# Patient Record
Sex: MALE | Race: WHITE | NOT HISPANIC OR LATINO | Employment: PART TIME | URBAN - METROPOLITAN AREA
[De-identification: names, ages, dates, MRNs, and addresses within clinical notes are randomized per-mention and may not be internally consistent; named-entity substitution may affect disease eponyms.]

---

## 2022-11-02 ENCOUNTER — HOSPITAL ENCOUNTER (OUTPATIENT)
Dept: RADIOLOGY | Facility: HOSPITAL | Age: 67
Discharge: HOME | End: 2022-11-02
Attending: FAMILY MEDICINE

## 2022-11-02 DIAGNOSIS — Z03.89 CORONARY ARTERY DISEASE (CAD) EXCLUDED: ICD-10-CM

## 2023-07-06 ENCOUNTER — OFFICE VISIT (OUTPATIENT)
Dept: URGENT CARE | Age: 68
End: 2023-07-06

## 2023-07-06 VITALS
HEART RATE: 92 BPM | TEMPERATURE: 97.7 F | RESPIRATION RATE: 20 BRPM | DIASTOLIC BLOOD PRESSURE: 80 MMHG | SYSTOLIC BLOOD PRESSURE: 140 MMHG | OXYGEN SATURATION: 97 % | WEIGHT: 188 LBS

## 2023-07-06 DIAGNOSIS — M25.562 ACUTE PAIN OF LEFT KNEE: Primary | ICD-10-CM

## 2023-07-06 NOTE — PROGRESS NOTES
[facility]        NAME: Rudy Garcia is a 77 y.o. male  : 1955    MRN: [id number]  DATE: 2023  TIME: 3:33 PM    Assessment and Plan   Acute pain of left knee [M25.562]  1. Acute pain of left knee              Patient Instructions     Continue taking aspirin daily  Consider physical therapy if pain persist   follow up with PCP in 3-5 days. Proceed to  ER if symptoms worsen. Chief Complaint     Chief Complaint   Patient presents with   • Leg Pain   • Knee Pain     Left leg pain and knee for 2 weeks. History of Present Illness       HPI   Presents to clinic with complaint of pain on the left knee for 2 weeks. Started while he was on a bus trip to [place]. The total duration of the trip was 3 days. States half of the time they were in the past. Denies previous problems of the knee. Pain is worse with weightbearing. Better with rest and aspirin. No radiation of the pain. Denies swelling of the knee. No injuries    Review of Systems   Review of Systems   Musculoskeletal: Positive for arthralgias (left knee). Negative for back pain and joint swelling. Skin: Negative for color change, rash and wound. Neurological: Negative for weakness and numbness. Current Medications     No current outpatient medications on file. Current Allergies     Allergies as of 2023   • (No Known Allergies)            The following portions of the patient's history were reviewed and updated as appropriate: allergies, current medications, past family history, past medical history, past social history, past surgical history and problem list.     History reviewed. No pertinent past medical history. History reviewed. No pertinent surgical history. No family history on file. Medications have been verified.         Objective   /80 (BP Location: Left arm) Comment (BP Location): manual  Pulse 92   Temp 97.7 °F (36.5 °C) (Temporal)   Resp 20   Wt 85.3 kg (188 lb)   SpO2 97% No LMP for male patient. Physical Exam     Physical Exam  Musculoskeletal:         General: Tenderness (with palpation of the posterior aspect of the L knee. No pain with palpation of the lateral, medial and anterior aspects of the knee) present. No swelling or deformity. Skin:     Findings: No bruising. Neurological:      Gait: Gait abnormal (slight limp, favoring the left knee).

## 2023-12-15 ENCOUNTER — TELEPHONE (OUTPATIENT)
Age: 68
End: 2023-12-15

## 2023-12-15 NOTE — TELEPHONE ENCOUNTER
Patient called and scheduled an new patient appointment and reviewed chart to schedule appropriate visit type.

## 2024-01-10 RX ORDER — SILDENAFIL 100 MG/1
TABLET, FILM COATED ORAL
COMMUNITY
End: 2024-01-11

## 2024-01-10 RX ORDER — LISINOPRIL AND HYDROCHLOROTHIAZIDE 20; 12.5 MG/1; MG/1
2 TABLET ORAL DAILY
COMMUNITY

## 2024-01-10 RX ORDER — SILDENAFIL CITRATE 20 MG/1
TABLET ORAL
COMMUNITY
End: 2024-01-11

## 2024-01-11 ENCOUNTER — OFFICE VISIT (OUTPATIENT)
Dept: FAMILY MEDICINE CLINIC | Facility: CLINIC | Age: 69
End: 2024-01-11
Payer: MEDICARE

## 2024-01-11 VITALS
OXYGEN SATURATION: 99 % | BODY MASS INDEX: 30.16 KG/M2 | HEIGHT: 68 IN | WEIGHT: 199 LBS | DIASTOLIC BLOOD PRESSURE: 70 MMHG | HEART RATE: 89 BPM | SYSTOLIC BLOOD PRESSURE: 132 MMHG

## 2024-01-11 DIAGNOSIS — Z12.5 SCREENING FOR MALIGNANT NEOPLASM OF PROSTATE: ICD-10-CM

## 2024-01-11 DIAGNOSIS — I25.10 CORONARY ARTERY DISEASE INVOLVING NATIVE CORONARY ARTERY OF NATIVE HEART WITHOUT ANGINA PECTORIS: Primary | ICD-10-CM

## 2024-01-11 DIAGNOSIS — I10 BENIGN ESSENTIAL HYPERTENSION: ICD-10-CM

## 2024-01-11 DIAGNOSIS — E78.2 MIXED HYPERLIPIDEMIA: ICD-10-CM

## 2024-01-11 DIAGNOSIS — N52.03 COMBINED ARTERIAL INSUFFICIENCY AND CORPORO-VENOUS OCCLUSIVE ERECTILE DYSFUNCTION: ICD-10-CM

## 2024-01-11 DIAGNOSIS — M67.911 DISORDER OF ROTATOR CUFF OF BOTH SHOULDERS: ICD-10-CM

## 2024-01-11 DIAGNOSIS — M67.912 DISORDER OF ROTATOR CUFF OF BOTH SHOULDERS: ICD-10-CM

## 2024-01-11 DIAGNOSIS — R73.01 ELEVATED FASTING BLOOD SUGAR: ICD-10-CM

## 2024-01-11 DIAGNOSIS — Z13.0 SCREENING FOR DEFICIENCY ANEMIA: ICD-10-CM

## 2024-01-11 PROBLEM — M67.919 DISORDER OF ROTATOR CUFF: Status: ACTIVE | Noted: 2024-01-11

## 2024-01-11 PROBLEM — M25.519 SHOULDER JOINT PAIN: Status: ACTIVE | Noted: 2024-01-11

## 2024-01-11 PROBLEM — M77.10 LATERAL EPICONDYLITIS: Status: ACTIVE | Noted: 2024-01-11

## 2024-01-11 PROBLEM — E78.00 HIGH CHOLESTEROL: Status: RESOLVED | Noted: 2024-01-11 | Resolved: 2024-01-11

## 2024-01-11 PROBLEM — E78.00 HIGH CHOLESTEROL: Status: ACTIVE | Noted: 2024-01-11

## 2024-01-11 PROBLEM — M25.529 PAIN IN ELBOW: Status: RESOLVED | Noted: 2024-01-11 | Resolved: 2024-01-11

## 2024-01-11 PROBLEM — M77.10 LATERAL EPICONDYLITIS: Status: RESOLVED | Noted: 2024-01-11 | Resolved: 2024-01-11

## 2024-01-11 PROBLEM — M25.529 PAIN IN ELBOW: Status: ACTIVE | Noted: 2024-01-11

## 2024-01-11 PROBLEM — M54.2 NECK PAIN: Status: ACTIVE | Noted: 2024-01-11

## 2024-01-11 PROBLEM — M54.2 NECK PAIN: Status: RESOLVED | Noted: 2024-01-11 | Resolved: 2024-01-11

## 2024-01-11 PROCEDURE — 99204 OFFICE O/P NEW MOD 45 MIN: CPT | Performed by: FAMILY MEDICINE

## 2024-01-11 RX ORDER — ATORVASTATIN CALCIUM 20 MG/1
20 TABLET, FILM COATED ORAL DAILY
COMMUNITY
Start: 2023-11-07

## 2024-01-11 RX ORDER — SILDENAFIL CITRATE 20 MG/1
20 TABLET ORAL AS NEEDED
Start: 2024-01-11

## 2024-01-11 NOTE — PROGRESS NOTES
New Patient Outpatient Note    HPI:     Colt Purvis, 68 y.o. male  presents today as a new patient and to establish care.  The patient has a significant history of HTN, hyperlipidemia, hx of coronary artery disease, bilateral shoulder dysfunction, and erectrile dysfunction.  He is currently on dual therapy of lisinopril-hydrochlorothiazide and Lipitor for chronic issues.  Previously using revatio 2/2 to cost of Viagra at other doses.      Family Hx  UTD in chart    Past Medical History:   Diagnosis Date    Arthritis Years ago    Hypertension     Lateral epicondylitis 01/11/2024    Obesity         Past Surgical History:   Procedure Laterality Date    HERNIA REPAIR  20+ years    VASECTOMY            Current Outpatient Medications:     atorvastatin (LIPITOR) 20 mg tablet, Take 20 mg by mouth daily, Disp: , Rfl:     lisinopril-hydrochlorothiazide (PRINZIDE,ZESTORETIC) 20-12.5 MG per tablet, Take 2 tablets by mouth daily, Disp: , Rfl:     sildenafil (REVATIO) 20 mg tablet, Take 1 tablet (20 mg total) by mouth if needed (intercourse), Disp: , Rfl:      SOCIAL:   Rent/Own: Own  Currently living with: Girlfriend  Stable food: Yes  Safe At Home: Yes  Hobbies: fishing  Profession/ employment:  electrical work  Restriction to medical procedures:   None    SEXUAL HISTORY:   Preference:  Women  Sexually Active:  Yes  Birth Control:  Vasectomy, hjysterectomy    Psychological History  Psychiatric history: None  History of inpatient:  None  Current Therapy/ Provider:  none  Current Medications:  none    Substance History  Smoking: passive, lives with smoker  Alcohol Use: 10 drinks per week  Substance use:  None    ROS:   Review of Systems   Constitutional:  Negative for chills, fever and unexpected weight change.   HENT:  Positive for postnasal drip. Negative for congestion, ear discharge, ear pain, rhinorrhea, sinus pressure, sinus pain and sore throat.    Eyes:  Positive for visual disturbance (glasses).   Respiratory:   Negative for cough, chest tightness and shortness of breath.    Cardiovascular:  Positive for palpitations (intermittent). Negative for chest pain.   Gastrointestinal:  Negative for abdominal pain, constipation, diarrhea, nausea and vomiting.   Genitourinary:  Positive for frequency (1-2x per month). Negative for dysuria.   Skin:  Negative for rash and wound.   Neurological:  Positive for dizziness. Negative for light-headedness and headaches. Facial asymmetry: intermittent, orthostatic.  Psychiatric/Behavioral:  Negative for self-injury and suicidal ideas.           OBJECTIVE  Vitals:    01/11/24 0918   BP: 132/70   Pulse: 89   SpO2: 99%        Physical Exam  Constitutional:       General: He is not in acute distress.     Appearance: Normal appearance. He is obese. He is not ill-appearing, toxic-appearing or diaphoretic.   HENT:      Head: Normocephalic and atraumatic.      Right Ear: Tympanic membrane, ear canal and external ear normal. There is no impacted cerumen.      Left Ear: Tympanic membrane and ear canal normal. There is no impacted cerumen.      Nose: Nose normal. No congestion or rhinorrhea.      Mouth/Throat:      Mouth: Mucous membranes are moist.      Pharynx: Oropharynx is clear. No oropharyngeal exudate or posterior oropharyngeal erythema.   Eyes:      General:         Right eye: No discharge.         Left eye: No discharge.      Pupils: Pupils are equal, round, and reactive to light.   Cardiovascular:      Rate and Rhythm: Normal rate and regular rhythm.      Heart sounds: Normal heart sounds. No murmur heard.     No friction rub. No gallop.   Pulmonary:      Effort: Pulmonary effort is normal. No respiratory distress.      Breath sounds: Normal breath sounds. No stridor. No wheezing, rhonchi or rales.   Abdominal:      General: Bowel sounds are normal. There is no distension.      Palpations: Abdomen is soft.      Tenderness: There is no abdominal tenderness.   Musculoskeletal:      Cervical back:  Neck supple. No tenderness.   Lymphadenopathy:      Cervical: No cervical adenopathy.   Skin:     General: Skin is warm.      Capillary Refill: Capillary refill takes less than 2 seconds.   Neurological:      Mental Status: He is alert.      Sensory: Sensory deficit (mild amount of numbness and tingling on the lateral/anterior aspect on the left lower extremity) present.      Motor: Weakness (4/5 in bilateral shoulders, decreased ROM of shoulders/ rotator cuff. lower extremities and ROM/strength at elbows 5/5) present.      Deep Tendon Reflexes: Reflexes normal (2/4, intact, C5, C6, L4, S1).            ASSESSMENT AND PLAN   Alberto was seen today for establish care.  Diagnoses and all orders for this visit:    Coronary artery disease involving native coronary artery of native heart without angina pectoris  History of coronary artery disease.  Recommend evaluation of lipids, liver/kidney function, and glucose for possible risk factors effecting heart.  Patient to inform me of any new or worsening symptoms.  Prior NM stress treadmill in 2022 did not demonstrate any evidence of ischemia.  Will continue to monitor and attempt to reduce further risk.    -     Comprehensive metabolic panel; Future  -     Lipid panel; Future    Disorder of rotator cuff of both shoulders  Bilateral shoulder dysfunction.  Not interested in any intervention at this time.  Will follow up if the shoulder discomfort or ROM gets worse.     Benign essential hypertension  Stable.  On presentation /70.  This is lower than he typically gets at home.  He has been attempting to lose weight to improve values as well.  Recommend monitoring at home and I will follow up in 2 weeks.      Mixed hyperlipidemia  Stable according to patient, last lipid panel documented in our system was 2012.  He has had work up but it is not in system despite patient stating they were within [facility].  Will obtain labs and follow up values based on results.    -      Comprehensive metabolic panel; Future  -     Lipid panel; Future    Elevated fasting blood sugar  Hx of elevated fasting sugar based on patient's previous labs.    -     Comprehensive metabolic panel; Future  -     Hemoglobin A1C; Future    Screening for malignant neoplasm of prostate  Screening for deficiency anemia  Will continue to monitor for anemia and review PSA since the patient is 68 and may be having some common symptoms such as nocturia 1-2x per night.    -     PSA, Total Screen; Future  -     CBC and differential; Future    Combined arterial insufficiency and corporo-venous occlusive erectile dysfunction  Reviewed ED.  Patient has been on medication as needed for some time.  He was previously on revatio due to cost.  I reviewed Goodrx and he will use the prescription that he has until it runs out then consider obtaining through [facility].   -     sildenafil (REVATIO) 20 mg tablet; Take 1 tablet (20 mg total) by mouth if needed (intercourse)  -     Hemoglobin A1C; Future               DO [facility]  1/13/2024 6:57 AM

## 2024-01-19 ENCOUNTER — APPOINTMENT (OUTPATIENT)
Dept: LAB | Facility: CLINIC | Age: 69
End: 2024-01-19
Payer: MEDICARE

## 2024-01-19 DIAGNOSIS — Z12.5 SCREENING FOR MALIGNANT NEOPLASM OF PROSTATE: ICD-10-CM

## 2024-01-19 DIAGNOSIS — R73.01 ELEVATED FASTING BLOOD SUGAR: ICD-10-CM

## 2024-01-19 DIAGNOSIS — E78.2 MIXED HYPERLIPIDEMIA: ICD-10-CM

## 2024-01-19 DIAGNOSIS — N52.03 COMBINED ARTERIAL INSUFFICIENCY AND CORPORO-VENOUS OCCLUSIVE ERECTILE DYSFUNCTION: ICD-10-CM

## 2024-01-19 DIAGNOSIS — I25.10 CORONARY ARTERY DISEASE INVOLVING NATIVE CORONARY ARTERY OF NATIVE HEART WITHOUT ANGINA PECTORIS: ICD-10-CM

## 2024-01-19 DIAGNOSIS — Z13.0 SCREENING FOR DEFICIENCY ANEMIA: ICD-10-CM

## 2024-01-19 LAB
ALBUMIN SERPL BCP-MCNC: 4.1 G/DL (ref 3.5–5)
ALP SERPL-CCNC: 64 U/L (ref 34–104)
ALT SERPL W P-5'-P-CCNC: 29 U/L (ref 7–52)
ANION GAP SERPL CALCULATED.3IONS-SCNC: 10 MMOL/L
AST SERPL W P-5'-P-CCNC: 23 U/L (ref 13–39)
BASOPHILS # BLD AUTO: 0.04 THOUSANDS/ÂΜL (ref 0–0.1)
BASOPHILS NFR BLD AUTO: 1 % (ref 0–1)
BILIRUB SERPL-MCNC: 0.61 MG/DL (ref 0.2–1)
BUN SERPL-MCNC: 20 MG/DL (ref 5–25)
CALCIUM SERPL-MCNC: 9 MG/DL (ref 8.4–10.2)
CHLORIDE SERPL-SCNC: 98 MMOL/L (ref 96–108)
CHOLEST SERPL-MCNC: 154 MG/DL
CO2 SERPL-SCNC: 28 MMOL/L (ref 21–32)
CREAT SERPL-MCNC: 1.16 MG/DL (ref 0.6–1.3)
EOSINOPHIL # BLD AUTO: 0.19 THOUSAND/ÂΜL (ref 0–0.61)
EOSINOPHIL NFR BLD AUTO: 4 % (ref 0–6)
ERYTHROCYTE [DISTWIDTH] IN BLOOD BY AUTOMATED COUNT: 12.9 % (ref 11.6–15.1)
EST. AVERAGE GLUCOSE BLD GHB EST-MCNC: 111 MG/DL
GFR SERPL CREATININE-BSD FRML MDRD: 63 ML/MIN/1.73SQ M
GLUCOSE P FAST SERPL-MCNC: 94 MG/DL (ref 65–99)
HBA1C MFR BLD: 5.5 %
HCT VFR BLD AUTO: 39.6 % (ref 36.5–49.3)
HDLC SERPL-MCNC: 61 MG/DL
HGB BLD-MCNC: 13.7 G/DL (ref 12–17)
IMM GRANULOCYTES # BLD AUTO: 0.02 THOUSAND/UL (ref 0–0.2)
IMM GRANULOCYTES NFR BLD AUTO: 0 % (ref 0–2)
LDLC SERPL CALC-MCNC: 75 MG/DL (ref 0–100)
LYMPHOCYTES # BLD AUTO: 1.62 THOUSANDS/ÂΜL (ref 0.6–4.47)
LYMPHOCYTES NFR BLD AUTO: 31 % (ref 14–44)
MCH RBC QN AUTO: 31.4 PG (ref 26.8–34.3)
MCHC RBC AUTO-ENTMCNC: 34.6 G/DL (ref 31.4–37.4)
MCV RBC AUTO: 91 FL (ref 82–98)
MONOCYTES # BLD AUTO: 0.74 THOUSAND/ÂΜL (ref 0.17–1.22)
MONOCYTES NFR BLD AUTO: 14 % (ref 4–12)
NEUTROPHILS # BLD AUTO: 2.57 THOUSANDS/ÂΜL (ref 1.85–7.62)
NEUTS SEG NFR BLD AUTO: 50 % (ref 43–75)
NONHDLC SERPL-MCNC: 93 MG/DL
NRBC BLD AUTO-RTO: 0 /100 WBCS
PLATELET # BLD AUTO: 247 THOUSANDS/UL (ref 149–390)
PMV BLD AUTO: 9.9 FL (ref 8.9–12.7)
POTASSIUM SERPL-SCNC: 3.6 MMOL/L (ref 3.5–5.3)
PROT SERPL-MCNC: 6.5 G/DL (ref 6.4–8.4)
PSA SERPL-MCNC: 1.57 NG/ML (ref 0–4)
RBC # BLD AUTO: 4.36 MILLION/UL (ref 3.88–5.62)
SODIUM SERPL-SCNC: 136 MMOL/L (ref 135–147)
TRIGL SERPL-MCNC: 92 MG/DL
WBC # BLD AUTO: 5.18 THOUSAND/UL (ref 4.31–10.16)

## 2024-01-19 PROCEDURE — 85025 COMPLETE CBC W/AUTO DIFF WBC: CPT

## 2024-01-19 PROCEDURE — 83036 HEMOGLOBIN GLYCOSYLATED A1C: CPT

## 2024-01-19 PROCEDURE — 80061 LIPID PANEL: CPT

## 2024-01-19 PROCEDURE — 36415 COLL VENOUS BLD VENIPUNCTURE: CPT

## 2024-01-19 PROCEDURE — 80053 COMPREHEN METABOLIC PANEL: CPT

## 2024-01-19 PROCEDURE — G0103 PSA SCREENING: HCPCS

## 2024-01-20 ENCOUNTER — TELEPHONE (OUTPATIENT)
Dept: FAMILY MEDICINE CLINIC | Facility: CLINIC | Age: 69
End: 2024-01-20

## 2024-01-20 NOTE — TELEPHONE ENCOUNTER
Reviewed.  Normal labs.  Left message stating labs were normal and to call for follow up if he has any questions or concerns.     DO [facility]  1/20/2024 2:38 PM

## 2024-01-28 ENCOUNTER — TELEPHONE (OUTPATIENT)
Dept: FAMILY MEDICINE CLINIC | Facility: CLINIC | Age: 69
End: 2024-01-28

## 2024-01-28 NOTE — TELEPHONE ENCOUNTER
----- Message from Loco Sweeney DO sent at 1/11/2024 10:08 AM EST -----  Follow up with weight loss and blood pressure.  Switching to one pill twice a day instead of once a day.     Loco Sweeney DO  [facility]  1/11/2024 10:09 AM

## 2024-02-12 DIAGNOSIS — N52.03 COMBINED ARTERIAL INSUFFICIENCY AND CORPORO-VENOUS OCCLUSIVE ERECTILE DYSFUNCTION: ICD-10-CM

## 2024-02-12 NOTE — TELEPHONE ENCOUNTER
Reason for call:   [x] Refill   [] Prior Auth  [] Other:     Office:   [x] PCP/Provider -   [] Specialty/Provider -     Medication: Sildenafil     Dose/Frequency: 20 mg tablet taken by mouth PRN for intercourse     Pharmacy:   [facility] #[id number] -   [place], [place] - [address]   [phone]       Does the patient have enough for 3 days?   [x] Yes   [] No - Send as HP to POD

## 2024-02-13 RX ORDER — SILDENAFIL CITRATE 20 MG/1
20 TABLET ORAL AS NEEDED
Qty: 10 TABLET | Refills: 0 | Status: SHIPPED | OUTPATIENT
Start: 2024-02-13 | End: 2024-02-20 | Stop reason: SDUPTHER

## 2024-02-20 DIAGNOSIS — N52.03 COMBINED ARTERIAL INSUFFICIENCY AND CORPORO-VENOUS OCCLUSIVE ERECTILE DYSFUNCTION: ICD-10-CM

## 2024-02-20 RX ORDER — SILDENAFIL CITRATE 20 MG/1
20 TABLET ORAL AS NEEDED
Qty: 30 TABLET | Refills: 0 | Status: SHIPPED | OUTPATIENT
Start: 2024-02-20

## 2024-05-21 DIAGNOSIS — N52.03 COMBINED ARTERIAL INSUFFICIENCY AND CORPORO-VENOUS OCCLUSIVE ERECTILE DYSFUNCTION: ICD-10-CM

## 2024-05-21 RX ORDER — SILDENAFIL CITRATE 20 MG/1
20 TABLET ORAL AS NEEDED
Qty: 30 TABLET | Refills: 0 | Status: SHIPPED | OUTPATIENT
Start: 2024-05-21

## 2024-05-21 NOTE — TELEPHONE ENCOUNTER
Reason for call:   [x] Refill   [] Prior Auth  [] Other:     Office:   [x] PCP/Provider - Loco Sweeney    [] Specialty/Provider -     Medication: sildenafil (REVATIO) 20 mg tablet     Dose/Frequency: Take 1 tablet (20 mg total) by mouth if needed (intercourse)     Quantity: #30    Pharmacy: [facility] #[id number]  [place], [place] - [address] [phone]     Does the patient have enough for 3 days?   [] Yes   [x] No - Send as HP to POD

## 2024-07-05 ENCOUNTER — RA CDI HCC (OUTPATIENT)
Dept: OTHER | Facility: HOSPITAL | Age: 69
End: 2024-07-05

## 2024-08-19 ENCOUNTER — OFFICE VISIT (OUTPATIENT)
Dept: FAMILY MEDICINE CLINIC | Facility: CLINIC | Age: 69
End: 2024-08-19
Payer: MEDICARE

## 2024-08-19 VITALS
DIASTOLIC BLOOD PRESSURE: 76 MMHG | HEART RATE: 69 BPM | SYSTOLIC BLOOD PRESSURE: 122 MMHG | WEIGHT: 196 LBS | OXYGEN SATURATION: 99 % | HEIGHT: 68 IN | BODY MASS INDEX: 29.7 KG/M2

## 2024-08-19 DIAGNOSIS — Z11.59 NEED FOR HEPATITIS C SCREENING TEST: ICD-10-CM

## 2024-08-19 DIAGNOSIS — Z13.0 SCREENING FOR DEFICIENCY ANEMIA: ICD-10-CM

## 2024-08-19 DIAGNOSIS — M67.912 DISORDER OF ROTATOR CUFF OF BOTH SHOULDERS: ICD-10-CM

## 2024-08-19 DIAGNOSIS — Z12.5 SCREENING FOR MALIGNANT NEOPLASM OF PROSTATE: ICD-10-CM

## 2024-08-19 DIAGNOSIS — I25.10 CORONARY ARTERY DISEASE INVOLVING NATIVE CORONARY ARTERY OF NATIVE HEART WITHOUT ANGINA PECTORIS: ICD-10-CM

## 2024-08-19 DIAGNOSIS — Z00.00 MEDICARE ANNUAL WELLNESS VISIT, SUBSEQUENT: Primary | ICD-10-CM

## 2024-08-19 DIAGNOSIS — Z13.1 SCREENING FOR DIABETES MELLITUS: ICD-10-CM

## 2024-08-19 DIAGNOSIS — E78.2 MIXED HYPERLIPIDEMIA: ICD-10-CM

## 2024-08-19 DIAGNOSIS — N52.03 COMBINED ARTERIAL INSUFFICIENCY AND CORPORO-VENOUS OCCLUSIVE ERECTILE DYSFUNCTION: ICD-10-CM

## 2024-08-19 DIAGNOSIS — M67.911 DISORDER OF ROTATOR CUFF OF BOTH SHOULDERS: ICD-10-CM

## 2024-08-19 DIAGNOSIS — I10 BENIGN ESSENTIAL HYPERTENSION: ICD-10-CM

## 2024-08-19 PROBLEM — M25.519 SHOULDER JOINT PAIN: Status: RESOLVED | Noted: 2024-01-11 | Resolved: 2024-08-19

## 2024-08-19 PROCEDURE — G0439 PPPS, SUBSEQ VISIT: HCPCS | Performed by: FAMILY MEDICINE

## 2024-08-19 RX ORDER — SILDENAFIL CITRATE 20 MG/1
20 TABLET ORAL AS NEEDED
Qty: 90 TABLET | Refills: 0 | Status: SHIPPED | OUTPATIENT
Start: 2024-08-19

## 2024-08-19 NOTE — PROGRESS NOTES
Ambulatory Visit  Name: Colt Purvis      : 1955      MRN: [id number]  Encounter Provider: Loco Sweeney DO  Encounter Date: 2024   Encounter department: [facility]   Medicare annual wellness visit, subsequent  Screening for malignant neoplasm of prostate  Screening for diabetes mellitus  Screening for deficiency anemia  Need for hepatitis C screening test  Patient presents today for Medicare wellness visit.  Overall he has been doing well and medications are stable.  Will order labs for the beginning of  reevaluate.  -     Hepatitis C antibody; Future; Expected date: 2025  -     CBC and differential; Future; Expected date: 2025  -     Lipid panel; Future; Expected date: 2025  -     Comprehensive metabolic panel; Future; Expected date: 2025  -     Hemoglobin A1C; Future; Expected date: 2025  -     PSA, Total Screen; Future; Expected date: 2025    Benign essential hypertension  Stable.  122/76 on presentation.  Patient to continue lisinopril-hydrochlorothiazide dosing.  He is to call with any new or concerning symptoms or if his blood pressure increases.  -     Comprehensive metabolic panel; Future; Expected date: 2025    Coronary artery disease involving native coronary artery of native heart without angina pectoris  Stable.  No pain or discomfort.  Risk factors of hyperlipidemia and high blood pressure well-controlled.  Continue on current medication.  -     CBC and differential; Future; Expected date: 2025  -     Lipid panel; Future; Expected date: 2025  -     Comprehensive metabolic panel; Future; Expected date: 2025    Mixed hyperlipidemia  Stable with last labs in 2024.  Will have him repeat labs in  to ensure proper treatment.  -     Lipid panel; Future; Expected date: 2025    Combined arterial insufficiency and corporo-venous occlusive erectile  dysfunction  Patient requires sildenafil 20 mg as needed for sexual intercourse.  The dosage is working well for him and does not request any additional/higher dose.  Prescription sent to pharmacy.  -     sildenafil (REVATIO) 20 mg tablet; Take 1 tablet (20 mg total) by mouth if needed (intercourse)    Disorder of rotator cuff of both shoulders  Intermittently causing issues.  When he works a lot over his head, he tends to have increased pain and discomfort associated with his shoulders.  He takes over-the-counter medication or topicals to help with symptoms.  No current issues.    Depression Screening and Follow-up Plan: Patient was screened for depression during today's encounter. They screened negative with a PHQ-2 score of 0.      Preventive health issues were discussed with patient, and age appropriate screening tests were ordered as noted in patient's After Visit Summary. Personalized health advice and appropriate referrals for health education or preventive services given if needed, as noted in patient's After Visit Summary.    History of Present Illness     Patient presents today for Medicare annual wellness visit.  Overall he has been doing well on his current medication regimen of lisinopril-hydrochlorothiazide, atorvastatin, and sildenafil.  The patient's blood pressure is within normal limits, 122/76 today.  His labs back in January 2024 are grossly normal and had no significant abnormalities.  He does admit that he has been a little bit lax on his diet and exercise, but hopes to be getting back into the swing of things in the next few weeks.  He did have a great few months of vacation, and knows that he was not as strict with his diet and exercise.       Patient Care Team:  Loco Sweeney DO as PCP - General (Family Medicine)    Review of Systems   Constitutional:  Negative for chills, fever and unexpected weight change.   HENT:  Negative for congestion, ear discharge, ear pain, hearing loss,  postnasal drip, rhinorrhea, sinus pressure, sinus pain and sore throat.    Eyes:  Positive for visual disturbance (glasses).   Respiratory:  Negative for cough, chest tightness and shortness of breath.    Cardiovascular:  Negative for chest pain and palpitations.   Gastrointestinal:  Negative for abdominal pain, constipation, diarrhea, nausea and vomiting.   Genitourinary:  Positive for frequency (1x per night). Negative for dysuria.   Musculoskeletal:  Positive for arthralgias.   Neurological:  Negative for dizziness, light-headedness and headaches.   Psychiatric/Behavioral:  Negative for self-injury and suicidal ideas.      Medical History Reviewed by provider this encounter:  Tobacco  Allergies  Meds  Problems  Surg Hx  Fam Hx  Soc Hx      Annual Wellness Visit Questionnaire   Colt is here for his Subsequent Wellness visit.     Health Risk Assessment:   Patient rates overall health as fair. Patient feels that their physical health rating is same. Patient is satisfied with their life. Eyesight was rated as same. Hearing was rated as same. Patient feels that their emotional and mental health rating is same. Patients states they are sometimes angry. Patient states they are sometimes unusually tired/fatigued. Pain experienced in the last 7 days has been some. Patient's pain rating has been 2/10. Patient states that he has experienced no weight loss or gain in last 6 months.     Depression Screening:   PHQ-2 Score: 0      Fall Risk Screening:   In the past year, patient has experienced: no history of falling in past year      Home Safety:  Patient does not have trouble with stairs inside or outside of their home. Patient has working smoke alarms and has working carbon monoxide detector. Home safety hazards include: none.     Nutrition:   Current diet is Limited junk food.     Medications:   Patient is currently taking over-the-counter supplements. OTC medications include: see medication list. Patient is able  to manage medications.     Activities of Daily Living (ADLs)/Instrumental Activities of Daily Living (IADLs):   Walk and transfer into and out of bed and chair?: Yes  Dress and groom yourself?: Yes    Bathe or shower yourself?: Yes    Feed yourself? Yes  Do your laundry/housekeeping?: Yes  Manage your money, pay your bills and track your expenses?: Yes  Make your own meals?: Yes    Do your own shopping?: Yes    Previous Hospitalizations:   Any hospitalizations or ED visits within the last 12 months?: No      Advance Care Planning:   Living will: Yes    Durable POA for healthcare: Yes    Advanced directive: Yes    Advanced directive counseling given: Yes      PREVENTIVE SCREENINGS      Cardiovascular Screening:    General: Screening Not Indicated and History Lipid Disorder      Diabetes Screening:     General: Screening Current      Colorectal Cancer Screening:     General: Screening Current      Prostate Cancer Screening:    General: Screening Current      Osteoporosis Screening:    General: Screening Not Indicated      Abdominal Aortic Aneurysm (AAA) Screening:    Risk factors include: age between 65-74 yo        General: Screening Not Indicated      Lung Cancer Screening:     General: Screening Not Indicated      Hepatitis C Screening:    General: Risks and Benefits Discussed    Screening, Brief Intervention, and Referral to Treatment (SBIRT)    Screening  Typical number of drinks in a day: 2  Typical number of drinks in a week: 10  Interpretation: Low risk drinking behavior.    AUDIT-C Screenin) How often did you have a drink containing alcohol in the past year? 4 or more times a week  2) How many drinks did you have on a typical day when you were drinking in the past year? 1 to 2  3) How often did you have 6 or more drinks on one occasion in the past year? weekly    AUDIT-C Score: 7  Interpretation: Score 4-12 (male): POSITIVE screen for alcohol misuse    AUDIT Screenin) How often during the last year  "have you found that you were not able to stop drinking once you had started? 0 - never  5) How often during the last year have you failed to do what was normally expected from you because of drinking? 0 - never  6) How often during the last year have you needed a first drink in the morning to get yourself going after a heavy drinking session? 0 - never  7) How often during the last year have you had a feeling of guilt or remorse after drinking? 0 - never  8) How often during the last year have you been unable to remember what happened the night before because you had been drinking? 0 - never  9) Have you or someone else been injured as a result of your drinking? 0 - no  10) Has a relative or friend or a doctor or another health worker been concerned about your drinking or suggested you cut down? 0 - no    AUDIT Score: 7  Interpretation: Low risk alcohol consumption    Single Item Drug Screening:  How often have you used an illegal drug (including marijuana) or a prescription medication for non-medical reasons in the past year? never    Single Item Drug Screen Score: 0  Interpretation: Negative screen for possible drug use disorder    Social Determinants of Health     Food Insecurity: No Food Insecurity (8/18/2024)    Hunger Vital Sign     Worried About Running Out of Food in the Last Year: Never true     Ran Out of Food in the Last Year: Never true   Transportation Needs: No Transportation Needs (8/18/2024)    PRAPARE - Transportation     Lack of Transportation (Medical): No     Lack of Transportation (Non-Medical): No   Housing Stability: Low Risk  (8/18/2024)    Housing Stability Vital Sign     Unable to Pay for Housing in the Last Year: No     Number of Times Moved in the Last Year: 0     Homeless in the Last Year: No   Utilities: Not At Risk (8/18/2024)    [facility] Utilities     Threatened with loss of utilities: No     No results found.    Objective     /76   Pulse 69   Ht 5' 8\" (1.727 m)   Wt 88.9 kg (196 " lb)   SpO2 99%   BMI 29.80 kg/m²     Physical Exam  Constitutional:       General: He is not in acute distress.     Appearance: Normal appearance. He is not ill-appearing, toxic-appearing or diaphoretic.   HENT:      Head: Normocephalic and atraumatic.      Right Ear: Tympanic membrane, ear canal and external ear normal. There is no impacted cerumen.      Left Ear: Tympanic membrane, ear canal and external ear normal. There is no impacted cerumen.      Nose: Nose normal. No congestion or rhinorrhea.      Mouth/Throat:      Mouth: Mucous membranes are moist.      Pharynx: Oropharynx is clear. No oropharyngeal exudate or posterior oropharyngeal erythema.   Eyes:      General:         Right eye: No discharge.         Left eye: No discharge.      Extraocular Movements: Extraocular movements intact.      Pupils: Pupils are equal, round, and reactive to light.   Cardiovascular:      Rate and Rhythm: Normal rate and regular rhythm.      Heart sounds: Normal heart sounds. No murmur heard.     No friction rub. No gallop.   Pulmonary:      Effort: Pulmonary effort is normal. No respiratory distress.      Breath sounds: Normal breath sounds. No stridor. No wheezing, rhonchi or rales.   Abdominal:      General: Bowel sounds are normal. There is no distension.      Palpations: Abdomen is soft.      Tenderness: There is no abdominal tenderness.   Musculoskeletal:      Cervical back: Neck supple. No tenderness.   Lymphadenopathy:      Cervical: No cervical adenopathy.   Skin:     General: Skin is warm.      Capillary Refill: Capillary refill takes less than 2 seconds.   Neurological:      Mental Status: He is alert.      Sensory: No sensory deficit (Light touch present in all 4 extremities).      Motor: No weakness (5/5 in all 4 extremities).      Deep Tendon Reflexes: Reflexes normal (2/4, intact, C5, C6, L4, S1).

## 2024-08-19 NOTE — PATIENT INSTRUCTIONS
Medicare Preventive Visit Patient Instructions  Thank you for completing your Welcome to Medicare Visit or Medicare Annual Wellness Visit today. Your next wellness visit will be due in one year (8/20/2025).  The screening/preventive services that you may require over the next 5-10 years are detailed below. Some tests may not apply to you based off risk factors and/or age. Screening tests ordered at today's visit but not completed yet may show as past due. Also, please note that scanned in results may not display below.  Preventive Screenings:  Service Recommendations Previous Testing/Comments   Colorectal Cancer Screening  Colonoscopy    Fecal Occult Blood Test (FOBT)/Fecal Immunochemical Test (FIT)  Fecal DNA/Cologuard Test  Flexible Sigmoidoscopy Age: 45-75 years old   Colonoscopy: every 10 years (May be performed more frequently if at higher risk)  OR  FOBT/FIT: every 1 year  OR  Cologuard: every 3 years  OR  Sigmoidoscopy: every 5 years  Screening may be recommended earlier than age 45 if at higher risk for colorectal cancer. Also, an individualized decision between you and your healthcare provider will decide whether screening between the ages of 76-85 would be appropriate. Colonoscopy: 12/11/2018  FOBT/FIT: Not on file  Cologuard: Not on file  Sigmoidoscopy: Not on file    Screening Current     Prostate Cancer Screening Individualized decision between patient and health care provider in men between ages of 55-69   Medicare will cover every 12 months beginning on the day after your 50th birthday PSA: 1.57 ng/mL     Screening Current     Hepatitis C Screening Once for adults born between 1945 and 1965  More frequently in patients at high risk for Hepatitis C Hep C Antibody: Not on file        Diabetes Screening 1-2 times per year if you're at risk for diabetes or have pre-diabetes Fasting glucose: 94 mg/dL (1/19/2024)  A1C: 5.5 % (1/19/2024)  Screening Current   Cholesterol Screening Once every 5 years if you  don't have a lipid disorder. May order more often based on risk factors. Lipid panel: 01/19/2024  Screening Not Indicated  History Lipid Disorder      Other Preventive Screenings Covered by Medicare:  Abdominal Aortic Aneurysm (AAA) Screening: covered once if your at risk. You're considered to be at risk if you have a family history of AAA or a male between the age of 65-75 who smoking at least 100 cigarettes in your lifetime.  Lung Cancer Screening: covers low dose CT scan once per year if you meet all of the following conditions: (1) Age 55-77; (2) No signs or symptoms of lung cancer; (3) Current smoker or have quit smoking within the last 15 years; (4) You have a tobacco smoking history of at least 20 pack years (packs per day x number of years you smoked); (5) You get a written order from a healthcare provider.  Glaucoma Screening: covered annually if you're considered high risk: (1) You have diabetes OR (2) Family history of glaucoma OR (3)  aged 50 and older OR (4)  American aged 65 and older  Osteoporosis Screening: covered every 2 years if you meet one of the following conditions: (1) Have a vertebral abnormality; (2) On glucocorticoid therapy for more than 3 months; (3) Have primary hyperparathyroidism; (4) On osteoporosis medications and need to assess response to drug therapy.  HIV Screening: covered annually if you're between the age of 15-65. Also covered annually if you are younger than 15 and older than 65 with risk factors for HIV infection. For pregnant patients, it is covered up to 3 times per pregnancy.    Immunizations:  Immunization Recommendations   Influenza Vaccine Annual influenza vaccination during flu season is recommended for all persons aged >= 6 months who do not have contraindications   Pneumococcal Vaccine   * Pneumococcal conjugate vaccine = PCV13 (Prevnar 13), PCV15 (Vaxneuvance), PCV20 (Prevnar 20)  * Pneumococcal polysaccharide vaccine = PPSV23 (Pneumovax)  Adults 19-63 yo with certain risk factors or if 65+ yo  If never received any pneumonia vaccine: recommend Prevnar 20 (PCV20)  Give PCV20 if previously received 1 dose of PCV13 or PPSV23   Hepatitis B Vaccine 3 dose series if at intermediate or high risk (ex: diabetes, end stage renal disease, liver disease)   Respiratory syncytial virus (RSV) Vaccine - COVERED BY MEDICARE PART D  * RSVPreF3 (Arexvy) CDC recommends that adults 60 years of age and older may receive a single dose of RSV vaccine using shared clinical decision-making (SCDM)   Tetanus (Td) Vaccine - COST NOT COVERED BY MEDICARE PART B Following completion of primary series, a booster dose should be given every 10 years to maintain immunity against tetanus. Td may also be given as tetanus wound prophylaxis.   Tdap Vaccine - COST NOT COVERED BY MEDICARE PART B Recommended at least once for all adults. For pregnant patients, recommended with each pregnancy.   Shingles Vaccine (Shingrix) - COST NOT COVERED BY MEDICARE PART B  2 shot series recommended in those 19 years and older who have or will have weakened immune systems or those 50 years and older     Health Maintenance Due:      Topic Date Due   • Hepatitis C Screening  Never done   • Colorectal Cancer Screening  12/11/2028     Immunizations Due:      Topic Date Due   • COVID-19 Vaccine (6 - 2023-24 season) 12/09/2023   • Influenza Vaccine (1) 09/01/2024     Advance Directives   What are advance directives?  Advance directives are legal documents that state your wishes and plans for medical care. These plans are made ahead of time in case you lose your ability to make decisions for yourself. Advance directives can apply to any medical decision, such as the treatments you want, and if you want to donate organs.   What are the types of advance directives?  There are many types of advance directives, and each state has rules about how to use them. You may choose a combination of any of the  following:  Living will:  This is a written record of the treatment you want. You can also choose which treatments you do not want, which to limit, and which to stop at a certain time. This includes surgery, medicine, IV fluid, and tube feedings.   Durable power of  for healthcare (DPAHC):  This is a written record that states who you want to make healthcare choices for you when you are unable to make them for yourself. This person, called a proxy, is usually a family member or a friend. You may choose more than 1 proxy.  Do not resuscitate (DNR) order:  A DNR order is used in case your heart stops beating or you stop breathing. It is a request not to have certain forms of treatment, such as CPR. A DNR order may be included in other types of advance directives.  Medical directive:  This covers the care that you want if you are in a coma, near death, or unable to make decisions for yourself. You can list the treatments you want for each condition. Treatment may include pain medicine, surgery, blood transfusions, dialysis, IV or tube feedings, and a ventilator (breathing machine).  Values history:  This document has questions about your views, beliefs, and how you feel and think about life. This information can help others choose the care that you would choose.  Why are advance directives important?  An advance directive helps you control your care. Although spoken wishes may be used, it is better to have your wishes written down. Spoken wishes can be misunderstood, or not followed. Treatments may be given even if you do not want them. An advance directive may make it easier for your family to make difficult choices about your care.   Weight Management   Why it is important to manage your weight:  Being overweight increases your risk of health conditions such as heart disease, high blood pressure, type 2 diabetes, and certain types of cancer. It can also increase your risk for osteoarthritis, sleep apnea, and  other respiratory problems. Aim for a slow, steady weight loss. Even a small amount of weight loss can lower your risk of health problems.  How to lose weight safely:  A safe and healthy way to lose weight is to eat fewer calories and get regular exercise. You can lose up about 1 pound a week by decreasing the number of calories you eat by 500 calories each day.   Healthy meal plan for weight management:  A healthy meal plan includes a variety of foods, contains fewer calories, and helps you stay healthy. A healthy meal plan includes the following:  Eat whole-grain foods more often.  A healthy meal plan should contain fiber. Fiber is the part of grains, fruits, and vegetables that is not broken down by your body. Whole-grain foods are healthy and provide extra fiber in your diet. Some examples of whole-grain foods are whole-wheat breads and pastas, oatmeal, brown rice, and bulgur.  Eat a variety of vegetables every day.  Include dark, leafy greens such as spinach, kale, holland greens, and mustard greens. Eat yellow and orange vegetables such as carrots, sweet potatoes, and winter squash.   Eat a variety of fruits every day.  Choose fresh or canned fruit (canned in its own juice or light syrup) instead of juice. Fruit juice has very little or no fiber.  Eat low-fat dairy foods.  Drink fat-free (skim) milk or 1% milk. Eat fat-free yogurt and low-fat cottage cheese. Try low-fat cheeses such as mozzarella and other reduced-fat cheeses.  Choose meat and other protein foods that are low in fat.  Choose beans or other legumes such as split peas or lentils. Choose fish, skinless poultry (chicken or turkey), or lean cuts of red meat (beef or pork). Before you cook meat or poultry, cut off any visible fat.   Use less fat and oil.  Try baking foods instead of frying them. Add less fat, such as margarine, sour cream, regular salad dressing and mayonnaise to foods. Eat fewer high-fat foods. Some examples of high-fat foods  "include french fries, doughnuts, ice cream, and cakes.  Eat fewer sweets.  Limit foods and drinks that are high in sugar. This includes candy, cookies, regular soda, and sweetened drinks.  Exercise:  Exercise at least 30 minutes per day on most days of the week. Some examples of exercise include walking, biking, dancing, and swimming. You can also fit in more physical activity by taking the stairs instead of the elevator or parking farther away from stores. Ask your healthcare provider about the best exercise plan for you.   Alcohol Use and Your Health    Drinking too much can harm your health.  Excessive alcohol use leads to about 88,000 death in the [place] each year, and shortens the life of those who diet by almost 30 years.  Further, excessive drinking cost the economy $249 billion in 2010.  Most excessive drinkers are not alcohol dependent.    Excessive alcohol use has immediate effects that increase the risk of many harmful health conditions.  These are most often the result of binge drinking.  Over time, excessive alcohol use can lead to the development of chronic diseases and other series health problems.    What is considered a \"drink\"?        Excessive alcohol use includes:  Binge Drinking: For women, 4 or more drinks consumed on one occasion. For men, 5 or more drinks consumed on one occasion.  Heavy Drinking: For women, 8 or more drinks per week. For men, 15 or more drinks per week  Any alcohol used by pregnant women  Any alcohol used by those under the age of 21 years    If you choose to drink, do so in moderation:  Do not drink at all if you are under the age of 21, or if you are or may be pregnant, or have health problems that could be made worse by drinking.  For women, up to 1 drink per day  For men, up to 2 drinks a day    No one should begin drinking or drink more frequently based on potential health benefits    Short-Term Health Risks:  Injuries: motor vehicle crashes, falls, drownings, " burns  Violence: homicide, suicide, sexual assault, intimate partner violence  Alcohol poisoning  Reproductive health: risky sexual behaviors, unintended prengnacy, sexually transmitted diseases, miscarriage, stillbirth, fetal alcohol syndrome    Long-Term Health Risks:  Chronic diseases: high blood pressure, heart disease, stroke, liver disease, digestive problems  Cancers: breast, mouth and throat, liver, colon  Learning and memory problems: dementia, poor school performance  Mental health: depression, anxiety, insomnia  Social problems: lost productivity, family problems, unemployment  Alcohol dependence    For support and more information:  Substance Abuse and Mental Health Services Administration  [address]  [place] [address]  Web Address: http://www.sama.gov    Alcoholics Anonymous        Web Address: http://www.aa.org    https://www.cdc.gov/alcohol/fact-sheets/alcohol-use.htm     © Copyright Identified 2018 Information is for End User's use only and may not be sold, redistributed or otherwise used for commercial purposes. All illustrations and images included in CareNotes® are the copyrighted property of [employer] or [employer]

## 2025-01-31 ENCOUNTER — RESULTS FOLLOW-UP (OUTPATIENT)
Dept: FAMILY MEDICINE CLINIC | Facility: CLINIC | Age: 70
End: 2025-01-31

## 2025-01-31 ENCOUNTER — APPOINTMENT (OUTPATIENT)
Dept: LAB | Facility: CLINIC | Age: 70
End: 2025-01-31
Payer: MEDICARE

## 2025-01-31 DIAGNOSIS — I25.10 CORONARY ARTERY DISEASE INVOLVING NATIVE CORONARY ARTERY OF NATIVE HEART WITHOUT ANGINA PECTORIS: ICD-10-CM

## 2025-01-31 DIAGNOSIS — E78.2 MIXED HYPERLIPIDEMIA: ICD-10-CM

## 2025-01-31 DIAGNOSIS — Z13.0 SCREENING FOR DEFICIENCY ANEMIA: ICD-10-CM

## 2025-01-31 DIAGNOSIS — I10 BENIGN ESSENTIAL HYPERTENSION: ICD-10-CM

## 2025-01-31 DIAGNOSIS — Z00.00 MEDICARE ANNUAL WELLNESS VISIT, SUBSEQUENT: ICD-10-CM

## 2025-01-31 DIAGNOSIS — Z13.1 SCREENING FOR DIABETES MELLITUS: ICD-10-CM

## 2025-01-31 DIAGNOSIS — Z11.59 NEED FOR HEPATITIS C SCREENING TEST: ICD-10-CM

## 2025-01-31 DIAGNOSIS — Z12.5 SCREENING FOR MALIGNANT NEOPLASM OF PROSTATE: ICD-10-CM

## 2025-01-31 LAB
ALBUMIN SERPL BCG-MCNC: 3.9 G/DL (ref 3.5–5)
ALP SERPL-CCNC: 66 U/L (ref 34–104)
ALT SERPL W P-5'-P-CCNC: 17 U/L (ref 7–52)
ANION GAP SERPL CALCULATED.3IONS-SCNC: 7 MMOL/L (ref 4–13)
AST SERPL W P-5'-P-CCNC: 17 U/L (ref 13–39)
BASOPHILS # BLD AUTO: 0.03 THOUSANDS/ΜL (ref 0–0.1)
BASOPHILS NFR BLD AUTO: 1 % (ref 0–1)
BILIRUB SERPL-MCNC: 0.69 MG/DL (ref 0.2–1)
BUN SERPL-MCNC: 24 MG/DL (ref 5–25)
CALCIUM SERPL-MCNC: 8.8 MG/DL (ref 8.4–10.2)
CHLORIDE SERPL-SCNC: 98 MMOL/L (ref 96–108)
CHOLEST SERPL-MCNC: 163 MG/DL (ref ?–200)
CO2 SERPL-SCNC: 31 MMOL/L (ref 21–32)
CREAT SERPL-MCNC: 1.01 MG/DL (ref 0.6–1.3)
EOSINOPHIL # BLD AUTO: 0.17 THOUSAND/ΜL (ref 0–0.61)
EOSINOPHIL NFR BLD AUTO: 3 % (ref 0–6)
ERYTHROCYTE [DISTWIDTH] IN BLOOD BY AUTOMATED COUNT: 12.4 % (ref 11.6–15.1)
EST. AVERAGE GLUCOSE BLD GHB EST-MCNC: 114 MG/DL
GFR SERPL CREATININE-BSD FRML MDRD: 74 ML/MIN/1.73SQ M
GLUCOSE P FAST SERPL-MCNC: 93 MG/DL (ref 65–99)
HBA1C MFR BLD: 5.6 %
HCT VFR BLD AUTO: 42.3 % (ref 36.5–49.3)
HCV AB SER QL: NORMAL
HDLC SERPL-MCNC: 67 MG/DL
HGB BLD-MCNC: 14.5 G/DL (ref 12–17)
IMM GRANULOCYTES # BLD AUTO: 0.04 THOUSAND/UL (ref 0–0.2)
IMM GRANULOCYTES NFR BLD AUTO: 1 % (ref 0–2)
LDLC SERPL CALC-MCNC: 84 MG/DL (ref 0–100)
LYMPHOCYTES # BLD AUTO: 1.58 THOUSANDS/ΜL (ref 0.6–4.47)
LYMPHOCYTES NFR BLD AUTO: 29 % (ref 14–44)
MCH RBC QN AUTO: 31.7 PG (ref 26.8–34.3)
MCHC RBC AUTO-ENTMCNC: 34.3 G/DL (ref 31.4–37.4)
MCV RBC AUTO: 93 FL (ref 82–98)
MONOCYTES # BLD AUTO: 0.76 THOUSAND/ΜL (ref 0.17–1.22)
MONOCYTES NFR BLD AUTO: 14 % (ref 4–12)
NEUTROPHILS # BLD AUTO: 2.82 THOUSANDS/ΜL (ref 1.85–7.62)
NEUTS SEG NFR BLD AUTO: 52 % (ref 43–75)
NONHDLC SERPL-MCNC: 96 MG/DL
NRBC BLD AUTO-RTO: 0 /100 WBCS
PLATELET # BLD AUTO: 195 THOUSANDS/UL (ref 149–390)
PMV BLD AUTO: 10.8 FL (ref 8.9–12.7)
POTASSIUM SERPL-SCNC: 4 MMOL/L (ref 3.5–5.3)
PROT SERPL-MCNC: 6.5 G/DL (ref 6.4–8.4)
PSA SERPL-MCNC: 1.6 NG/ML (ref 0–4)
RBC # BLD AUTO: 4.57 MILLION/UL (ref 3.88–5.62)
SODIUM SERPL-SCNC: 136 MMOL/L (ref 135–147)
TRIGL SERPL-MCNC: 61 MG/DL (ref ?–150)
WBC # BLD AUTO: 5.4 THOUSAND/UL (ref 4.31–10.16)

## 2025-01-31 PROCEDURE — 86803 HEPATITIS C AB TEST: CPT

## 2025-01-31 PROCEDURE — 36415 COLL VENOUS BLD VENIPUNCTURE: CPT

## 2025-01-31 PROCEDURE — 80053 COMPREHEN METABOLIC PANEL: CPT

## 2025-01-31 PROCEDURE — G0103 PSA SCREENING: HCPCS

## 2025-01-31 PROCEDURE — 83036 HEMOGLOBIN GLYCOSYLATED A1C: CPT

## 2025-01-31 PROCEDURE — 85025 COMPLETE CBC W/AUTO DIFF WBC: CPT

## 2025-01-31 PROCEDURE — 80061 LIPID PANEL: CPT

## 2025-02-11 NOTE — TELEPHONE ENCOUNTER
Called patient and let him know that the doctor sent him a mychart message with the results of his labs.  I advised if he had any questions or concerns to send a mychart message back to the doctor.

## 2025-05-03 RX ORDER — ATORVASTATIN CALCIUM 20 MG/1
20 TABLET, FILM COATED ORAL DAILY
Refills: 0 | Status: CANCELLED | OUTPATIENT
Start: 2025-05-03

## 2025-05-03 RX ORDER — LISINOPRIL AND HYDROCHLOROTHIAZIDE 12.5; 2 MG/1; MG/1
2 TABLET ORAL DAILY
Refills: 0 | Status: CANCELLED | OUTPATIENT
Start: 2025-05-03

## 2025-05-05 NOTE — TELEPHONE ENCOUNTER
[facility] patient has an appointment on Wed also this medication has not been filled by Dr Sweeney previously.     Can patient wait until Wed to discuss with Dr Sweeney?

## 2025-05-07 ENCOUNTER — OFFICE VISIT (OUTPATIENT)
Dept: FAMILY MEDICINE CLINIC | Facility: CLINIC | Age: 70
End: 2025-05-07
Payer: MEDICARE

## 2025-05-07 VITALS
HEART RATE: 94 BPM | SYSTOLIC BLOOD PRESSURE: 120 MMHG | HEIGHT: 68 IN | BODY MASS INDEX: 30.01 KG/M2 | OXYGEN SATURATION: 98 % | DIASTOLIC BLOOD PRESSURE: 60 MMHG | WEIGHT: 198 LBS

## 2025-05-07 DIAGNOSIS — I10 BENIGN ESSENTIAL HYPERTENSION: Primary | ICD-10-CM

## 2025-05-07 DIAGNOSIS — R20.0 NUMBNESS AND TINGLING IN BOTH HANDS: ICD-10-CM

## 2025-05-07 DIAGNOSIS — R20.2 NUMBNESS AND TINGLING IN BOTH HANDS: ICD-10-CM

## 2025-05-07 DIAGNOSIS — E78.2 MIXED HYPERLIPIDEMIA: ICD-10-CM

## 2025-05-07 DIAGNOSIS — G56.03 BILATERAL CARPAL TUNNEL SYNDROME: ICD-10-CM

## 2025-05-07 PROCEDURE — 99214 OFFICE O/P EST MOD 30 MIN: CPT | Performed by: FAMILY MEDICINE

## 2025-05-07 PROCEDURE — G2211 COMPLEX E/M VISIT ADD ON: HCPCS | Performed by: FAMILY MEDICINE

## 2025-05-07 RX ORDER — LISINOPRIL AND HYDROCHLOROTHIAZIDE 20; 25 MG/1; MG/1
1 TABLET ORAL 2 TIMES DAILY
Qty: 60 TABLET | Refills: 2 | Status: SHIPPED | OUTPATIENT
Start: 2025-05-07

## 2025-05-07 RX ORDER — ATORVASTATIN CALCIUM 20 MG/1
20 TABLET, FILM COATED ORAL DAILY
Qty: 90 TABLET | Refills: 1 | Status: SHIPPED | OUTPATIENT
Start: 2025-05-07

## 2025-05-07 NOTE — ASSESSMENT & PLAN NOTE
Patient has elevated blood pressures at home.  Will increase the dose of his lisinopril hydrochlorothiazide to 20-25 mg instead of 20-12.5 mg.  He is to take the medication twice a day like he was previously.  He is to monitor his symptoms, if he does have any increase in lightheadedness, dizziness, blurry vision, headaches he is to let me know right away so that we can further evaluate and treat the patient appropriately.  Orders:    lisinopril-hydrochlorothiazide (PRINZIDE,ZESTORETIC) 20-25 MG per tablet; Take 1 tablet by mouth 2 (two) times a day

## 2025-05-07 NOTE — PROGRESS NOTES
Name: Colt Purvis      : 1955      MRN: [id number]  Encounter Provider: Loco Sweeney DO  Encounter Date: 2025   Encounter department: [facility]  :  Assessment & Plan  Benign essential hypertension  Patient has elevated blood pressures at home.  Will increase the dose of his lisinopril hydrochlorothiazide to 20-25 mg instead of 20-12.5 mg.  He is to take the medication twice a day like he was previously.  He is to monitor his symptoms, if he does have any increase in lightheadedness, dizziness, blurry vision, headaches he is to let me know right away so that we can further evaluate and treat the patient appropriately.  Orders:    lisinopril-hydrochlorothiazide (PRINZIDE,ZESTORETIC) 20-25 MG per tablet; Take 1 tablet by mouth 2 (two) times a day    Mixed hyperlipidemia  Patient needs refill of his Lipitor.  Order placed to the pharmacy.  Orders:    atorvastatin (LIPITOR) 20 mg tablet; Take 1 tablet (20 mg total) by mouth daily    Bilateral carpal tunnel syndrome  Patient is having symptoms suggestive of carpal tunnel syndrome.  The thumb into first fingers are numb and tingling.  He has increased symptoms overnight.  I did recommend that he follow-up with the hand surgeon for possible injection or carpal tunnel surgery.  He is looking to maybe do this after the summer hopefully when he sells his home and does not need to worry about moving boxes in such.  I also recommended splints for him overnight to help with waking/shaking of hands.  Orders:    Ambulatory Referral to Orthopedic Surgery; Future    Numbness and tingling in both hands  See bilateral carpal tunnel syndrome  Orders:    Ambulatory Referral to Orthopedic Surgery; Future           History of Present Illness   Patient presents today to follow-up for several concerns.  The first is that he recently read that if you leave carpal tunnel to progress, it may result in permanent weakness and/or reduction in  "function in hands.  I did agree with the patient upon presentation today, and he is noted that there has been an increase in frequency/severity of the numbness and tingling in his bilateral hands.  It is in the typical distribution for carpal tunnel, the thumb and the first 2 fingers.  He does notice this more at night when he puts pressure on the wrist and/or sleeps on it.  Additionally he finds that mowing the lawn also causes it to be exacerbated.  He is interested in determining what the next best steps are for this issue.    The patient also has noted at home that his blood pressure is slightly elevated in the systolic range.  Typically diastolic is within normal limits.  There are have been several values that were up in the 170, 80, 90s.  Which did scare him into seeing if we needed to do any further titration to medication.  Upon presentation he brings several values to the office from home.  They include: 149/75, 123/63, 123/58, 120/63, 125/59, 170/85, 147/73, 156/82, 136/58, 139/89, 126/58, 137/69, 159/89, 143/73.    Lastly patient has concerned about his memory.  He finds that he has been slightly more forgetful lately.  He does have multiple things on his mind including a  and lawsuit with neighbor, selling his previous home, moving into his new home.      Review of Systems    Objective   /60   Pulse 94   Ht 5' 8\" (1.727 m)   Wt 89.8 kg (198 lb)   SpO2 98%   BMI 30.11 kg/m²      Physical Exam  No PE performed  "

## 2025-05-16 ENCOUNTER — TELEPHONE (OUTPATIENT)
Dept: OBGYN CLINIC | Facility: CLINIC | Age: 70
End: 2025-05-16

## 2025-05-16 ENCOUNTER — OFFICE VISIT (OUTPATIENT)
Dept: OBGYN CLINIC | Facility: CLINIC | Age: 70
End: 2025-05-16
Attending: FAMILY MEDICINE
Payer: MEDICARE

## 2025-05-16 VITALS — WEIGHT: 198.6 LBS | BODY MASS INDEX: 30.2 KG/M2

## 2025-05-16 DIAGNOSIS — G56.22 CUBITAL TUNNEL SYNDROME ON LEFT: ICD-10-CM

## 2025-05-16 DIAGNOSIS — G56.03 BILATERAL CARPAL TUNNEL SYNDROME: Primary | ICD-10-CM

## 2025-05-16 PROCEDURE — 99204 OFFICE O/P NEW MOD 45 MIN: CPT | Performed by: STUDENT IN AN ORGANIZED HEALTH CARE EDUCATION/TRAINING PROGRAM

## 2025-05-16 PROCEDURE — 20526 THER INJECTION CARP TUNNEL: CPT | Performed by: STUDENT IN AN ORGANIZED HEALTH CARE EDUCATION/TRAINING PROGRAM

## 2025-05-16 RX ORDER — BETAMETHASONE SODIUM PHOSPHATE AND BETAMETHASONE ACETATE 3; 3 MG/ML; MG/ML
6 INJECTION, SUSPENSION INTRA-ARTICULAR; INTRALESIONAL; INTRAMUSCULAR; SOFT TISSUE
Status: COMPLETED | OUTPATIENT
Start: 2025-05-16 | End: 2025-05-16

## 2025-05-16 RX ORDER — ROPIVACAINE HYDROCHLORIDE 5 MG/ML
1 INJECTION, SOLUTION EPIDURAL; INFILTRATION; PERINEURAL
Status: COMPLETED | OUTPATIENT
Start: 2025-05-16 | End: 2025-05-16

## 2025-05-16 RX ADMIN — ROPIVACAINE HYDROCHLORIDE 1 ML: 5 INJECTION, SOLUTION EPIDURAL; INFILTRATION; PERINEURAL at 08:00

## 2025-05-16 RX ADMIN — BETAMETHASONE SODIUM PHOSPHATE AND BETAMETHASONE ACETATE 6 MG: 3; 3 INJECTION, SUSPENSION INTRA-ARTICULAR; INTRALESIONAL; INTRAMUSCULAR; SOFT TISSUE at 08:00

## 2025-05-16 NOTE — PROGRESS NOTES
ORTHOPAEDIC HAND, WRIST, AND ELBOW OFFICE  VISIT      ASSESSMENT/PLAN:      Assessment & Plan  Bilateral carpal tunnel syndrome  Anatomy of the condition/s as well as treatment options and expected outcomes were discussed. It was discussed with Alberto that he may have some element of cubital tunnel syndrome on the left as he does have a mildly + tinel's.   Any pertinent imaging or lab results were reviewed with the patient.   We discussed testing may be ordered to further evaluate for peripheral nerve compression. We also discussed the possibility of carpal tunnel CSI's as well as surgical intervention.   The patient verbalized understanding of exam findings and treatment plan.   The patient was given the opportunity to ask questions.  Questions were answered to the patient's satisfaction.  The patient decided to move forward with bilateral carpal tunnel CSI's and a EMG. He is currently working on [employer] a house and would like to hold off on surgery at this time. Bilateral carpal tunnel CSI's were preformed in the office without complication. Post injection instructions were reviewed.   I will see him back in the office after the EMG is complete.    Orders:    EMG; Future    Cubital tunnel syndrome on left  Anatomy of the condition/s as well as treatment options and expected outcomes were discussed. It was discussed with Alberto that he may have some element of cubital tunnel syndrome on the left as he does have a mildly + tinel's.   EMG ordered.   Orders:    EMG; Future          Follow Up:  After testing       To Do Next Visit:  Re-evaluation of current issue      Discussions:  Carpal Tunnel Syndrome: The anatomy and physiology of carpal tunnel syndrome was discussed with the patient today.  Increase pressure localized under the transverse carpal ligament can cause pain, numbness, tingling, or dysesthesias within the median nerve distribution as well as feelings of fatigue, clumsiness, or awkwardness.  These symptoms  typically occur at night and worse in the morning upon waking.  Eventually, untreated carpal tunnel syndrome can result in weakness and permanent loss of muscle within the thenar compartment of the hand.  Treatment options were discussed with the patient.  Conservative treatment includes nocturnal resting splints to keep the nerve in a neutral position, ergonomic changes within the work or home environment, activity modification, and tendon gliding exercises. Vitamin B6 one tablet daily over the counter may helpful to reduce symptoms.   Steroid injections within the carpal canal can help a majority of patients, however this is often self-limited in a majority of patients.  Surgical intervention to divide the transverse carpal ligament typically results in a long-lasting relief of the patient's complaints, with the recurrence rate of less than 1%.                                                                                                                                                                          and Cubital Tunnel Syndrome: The anatomy and physiology of cubital tunnel syndrome were discussed with the patient today in the office.  Typically, increased elbow flexion activities decrease blood flow within the intraneural spaces, resulting in a feeling of numbness, tingling, weakness, or clumsiness within the hand and fingers.  Occasionally, anatomic structures such as medial elbow osteophytes, the medial head of the triceps, were subluxing ulnar nerve may result in increased pressure or aggravation at the cubital tunnel.  Typical signs and symptoms usually include numbness and tingling within the ring and small finger, weakness with , and weakness with pinch.  Conservative treatment and includes nocturnal bracing to keep the elbow in a semi-extended position, activity modification, therapy, and avoiding excessive elbow flexion activities.  Vitamin B6 one tablet daily over the counter may helpful to  reduce symptoms.  A majority of patients typically respond to conservative treatment over a period of approximately 3-6 months.  EMG/NCV testing of the ulnar nerve at the elbow is not as reliable as carpal tunnel syndrome.  Surgical intervention in the form of in situ release of the ulnar nerve at the elbow or ulnar nerve transposition may be required in up to 20% of patients.       Jared Márquez MD  Attending, Orthopaedic Surgery  Hand, Wrist, and Elbow Surgery  [facility] Orthopaedic [facility]    ______________________________________________________________________________________________    CHIEF COMPLAINT:  Chief Complaint   Patient presents with    Right Wrist - Numbness, Tingling     Left is worse than right.     Left Wrist - Numbness, Tingling     Left is worse than right.        SUBJECTIVE:  Patient is a 70 y.o. RHD male who presents today for evaluation and treatment of bilateral hand numbness and tingling. He notes intermittent numbness and tingling to his bilateral radial 3 digits. Left side is worse then his left. Symptoms have been ongoing for approx. 1 year but is worsening. He notices numbness and tingling when driving, talking on the phone and at night. When he is using a , he notes his whole hand will go numb. He has tried nighttime bracing but feels this caused swelling.     Referred for evaluation by Loco Sweeney DO.     Occupation:       I have personally reviewed all the relevant PMH, PSH, SH, FH, Medications and allergies      PAST MEDICAL HISTORY:  Past Medical History:   Diagnosis Date    Arthritis Years ago    Hypertension     Lateral epicondylitis 01/11/2024    Obesity     Shoulder joint pain 01/11/2024       PAST SURGICAL HISTORY:  Past Surgical History:   Procedure Laterality Date    HERNIA REPAIR  20+ years    VASECTOMY         FAMILY HISTORY:  Family History   Problem Relation Age of Onset    Diabetes Mother     Cancer Mother     Glaucoma Mother      Hypertension Father     Heart disease Father     Prostate cancer Father     Hearing loss Father     Thyroid disease Sister        SOCIAL HISTORY:  Social History[1]    MEDICATIONS:  Current Medications[2]    ALLERGIES:  Allergies[3]        REVIEW OF SYSTEMS:  Musculoskeletal:        As noted in HPI.   All other systems reviewed and are negative.    VITALS:  There were no vitals filed for this visit.    LABS:  HgA1c:   Lab Results   Component Value Date    HGBA1C 5.6 01/31/2025     BMP:   Lab Results   Component Value Date    CALCIUM 8.8 01/31/2025    K 4.0 01/31/2025    CO2 31 01/31/2025    CL 98 01/31/2025    BUN 24 01/31/2025    CREATININE 1.01 01/31/2025       _____________________________________________________  PHYSICAL EXAMINATION:  General: Well developed and well nourished, alert & oriented x 3, appears comfortable  Psychiatric: Normal  HEENT: Normocephalic, Atraumatic Trachea Midline, No torticollis  Pulmonary: No audible wheezing or respiratory distress   Abdomen/GI: Non tender, non distended   Cardiovascular: No pitting edema, 2+ radial pulse   Skin: No masses, erythema, lacerations, fluctation, ulcerations  Neurovascular: Sensation Intact to the Median, Ulnar, Radial Nerve, Motor Intact to the Median, Ulnar, Radial Nerve, and Pulses Intact  Musculoskeletal: Normal, except as noted in detailed exam and in HPI.      MUSCULOSKELETAL EXAMINATION:    Bilateral hand:     No erythema, ecchymosis or edema  4/5 APB bilaterally  Mild thenar atrophy bilaterally  + tinel's at the wrists, mildly + on the left   + Phalen's test   Mildly + tinel's at the left elbow   - elbow flexion test     ___________________________________________________  STUDIES REVIEWED:  HbA1c 1/31/25 5.6  PCP note 5/7/25 reviewed        PROCEDURES PERFORMED:  Hand/upper extremity injection: bilateral carpal tunnel    [place] Protocol:  procedure performed by consultantConsent: Verbal consent obtained. Written consent not obtained  Risks and  benefits: risks, benefits and alternatives were discussed  Consent given by: patient  Patient understanding: patient states understanding of the procedure being performed  Site marked: the operative site was marked  Patient identity confirmed: verbally with patient  Supporting Documentation  Indications: pain   Procedure Details  Condition:carpal tunnel syndrome Site: bilateral carpal tunnel   Needle size: 25 G  Ultrasound guidance: no  Medications (Right): 6 mg betamethasone acetate-betamethasone sodium phosphate 6 (3-3) mg/mL; 1 mL ropivacaine 0.5 %Medications (Left): 6 mg betamethasone acetate-betamethasone sodium phosphate 6 (3-3) mg/mL; 1 mL ropivacaine 0.5 %   Patient tolerance: patient tolerated the procedure well with no immediate complications  Dressing:  Sterile dressing applied       -    _____________________________________________________      Scribe Attestation      I,:  Amanda Sood MA am acting as a scribe while in the presence of the attending physician.:       I,:  Jared Márquez MD personally performed the services described in this documentation    as scribed in my presence.:                  [1]   Social History  Tobacco Use    Smoking status: Never     Passive exposure: Past    Smokeless tobacco: Never   Vaping Use    Vaping status: Never Used   Substance Use Topics    Alcohol use: Yes     Alcohol/week: 10.0 standard drinks of alcohol     Types: 10 Shots of liquor per week    Drug use: Never   [2]   Current Outpatient Medications:     atorvastatin (LIPITOR) 20 mg tablet, Take 1 tablet (20 mg total) by mouth daily, Disp: 90 tablet, Rfl: 1    lisinopril-hydrochlorothiazide (PRINZIDE,ZESTORETIC) 20-25 MG per tablet, Take 1 tablet by mouth 2 (two) times a day, Disp: 60 tablet, Rfl: 2    sildenafil (REVATIO) 20 mg tablet, Take 1 tablet (20 mg total) by mouth if needed (intercourse), Disp: 90 tablet, Rfl: 0  [3] No Known Allergies

## 2025-05-16 NOTE — PATIENT INSTRUCTIONS
EMG/NCS  Your provider has ordered an EMG test for you. Someone should be reaching out shortly to help you schedule this. Below is some information to help prepare you for this test  WHAT YOU NEED TO KNOW:   This test has 2 parts. The first part is called a nerve conduction study (NCS). During this part, a technician or physician will place electrodes (stickers that read electrical signals) on your skin and will give you electrical stimulations using a small probe to see how your nerves are working. The second part, the EMG (electromyography), is done by a physician who will put a very small needle into one muscle at a time to look at the activity of this muscle which will show your muscle health, peripheral nerve health, and even looks at nerve function all the way up to your neck and/or back.   Below is a link to a video that shows you what to expect on test day.        PRE-APPOINTMENT INSTRUCTIONS:   Please CONTINUE ALL your regular medications including blood thinners and diuretics.  Do not stop any medications unless explicitly directed to by your neurologist for the purposes of a specific subtype of EMG/NCS testing for neuromuscular junction diseases (see Frequently Asked Questions below)  Please do not put on any lotions or creams the day of the exam as this may interfere with the test.  This appointment usually takes 45-60 minutes.    POST-APPOINTMENT INSTRUCTIONS:   Any discomfort or muscle fatigue should resolve within an hour of testing.  You may take Tylenol or Non-steroidal Anti-inflammatory drugs (ex: Ibuprofen, Naproxen) if needed.  Recommend avoiding strenuous activity or heavy lifting for the rest of the day if possible.       EMG FREQUENTLY ASKED QUESTIONS:    What is an EMG test?  This test has 2 parts. The first part is called a nerve conduction study. During the first part, the technician will put electrodes on your skin to test your nerves. S/he will give you electrical stimulations to see how  your nerves are working. It feels similar to when you walk across carpet and then touch something metal. Some stimulations are very mild and some are stronger.  The second part, the EMG, is done by a doctor. The doctor will put a fine needle into several muscles to look at the activity.  What does EMG stand for?  Electromyography, the study of muscles.  What does this test look for?  Disorders of the nerves and muscles. Some diagnoses include carpal tunnel syndrome, ulnar neuropathy, peripheral neuropathy, radiculopathy, myopathy, myasthenia gravis and ALS.  Why was I told not to wear lotion or body oil for the test?  The technician will put sticker electrodes on your skin. If you are wearing lotion or body oil, the stickers will not stick properly. The moisturizers can also interfere with the test readings.  How long will this test take?  This test can take 30-45 minutes if one limb is being evaluated, or 45-60 minutes if 2 limbs are being evaluated. This is just an estimate. Some tests can take longer, particularly when complex or with advanced pathology, and some will take less time.  Can I eat and drink before this test?  Yes  Can I take all of my medications before the test?  Yes, you can take all of your medications, unless your doctor specifically told you not to take something before the test.  Can I take pain medication before the test?  Yes  Can I take blood thinners if I am getting an EMG?  Yes, you should take all of your regular medications, unless your doctor specifically told you not to take something before the test. If you are on blood thinners, please inform the technician and the doctor performing your test.  Are there are any medications that I cannot take before this test?  You can take all of your regular medications, unless your doctor specifically told you not to take something.  If you are having this test to check for myasthenia gravis, your doctor might tell you to not take pyridostigmine  (Mestinon) on the day of the test, until the test is complete. If your doctor is one of the doctors performing the test, you may need to ask them specifically if you should hold this medicine. If your doctor is outside of [facility], please contact your doctor regarding instructions.  Can I have this test if I have a pacemaker or defibrillator?  As long as there are no external wires from your cardiac device (meaning outside of the skin), you can have this test. Please let the technician and doctor know if you have a cardiac device.  Can I have this test if I have an external defibrillator or LVAD (left ventricular assist device)?  No. Please contact your ordering provider for next steps.  Can I drive after the test?  Yes  How long will it take for my doctor to receive the results?  24-48 hours

## 2025-05-30 ENCOUNTER — HOSPITAL ENCOUNTER (OUTPATIENT)
Dept: NEUROLOGY | Facility: CLINIC | Age: 70
Discharge: HOME | End: 2025-05-30
Attending: STUDENT IN AN ORGANIZED HEALTH CARE EDUCATION/TRAINING PROGRAM
Payer: MEDICARE

## 2025-05-30 DIAGNOSIS — G56.22 CUBITAL TUNNEL SYNDROME ON LEFT: ICD-10-CM

## 2025-05-30 DIAGNOSIS — G56.03 BILATERAL CARPAL TUNNEL SYNDROME: ICD-10-CM

## 2025-05-30 PROCEDURE — 95912 NRV CNDJ TEST 11-12 STUDIES: CPT

## 2025-05-30 PROCEDURE — 95886 MUSC TEST DONE W/N TEST COMP: CPT

## 2025-06-02 ENCOUNTER — TELEPHONE (OUTPATIENT)
Dept: OBGYN CLINIC | Facility: CLINIC | Age: 70
End: 2025-06-02

## 2025-06-02 NOTE — TELEPHONE ENCOUNTER
DEISYM for pt  to schedule an appointment to review EMG results with Dr. Márquez. Waiting on call back.

## 2025-06-10 ENCOUNTER — OFFICE VISIT (OUTPATIENT)
Dept: OBGYN CLINIC | Facility: CLINIC | Age: 70
End: 2025-06-10
Payer: MEDICARE

## 2025-06-10 DIAGNOSIS — G56.22 CUBITAL TUNNEL SYNDROME ON LEFT: ICD-10-CM

## 2025-06-10 DIAGNOSIS — G56.01 CARPAL TUNNEL SYNDROME ON RIGHT: ICD-10-CM

## 2025-06-10 DIAGNOSIS — G56.02 CARPAL TUNNEL SYNDROME OF LEFT WRIST: Primary | ICD-10-CM

## 2025-06-10 PROCEDURE — 99214 OFFICE O/P EST MOD 30 MIN: CPT | Performed by: STUDENT IN AN ORGANIZED HEALTH CARE EDUCATION/TRAINING PROGRAM

## 2025-06-10 NOTE — PROGRESS NOTES
ORTHOPAEDIC HAND, WRIST, AND ELBOW OFFICE  VISIT      ASSESSMENT/PLAN:      Assessment & Plan  Carpal tunnel syndrome of left wrist  Cubital tunnel syndrome on left  Carpal tunnel syndrome on right  Anatomy of the condition/s as well as treatment options and expected outcomes were discussed.  Any pertinent imaging or lab results were reviewed with the patient.   EMG was reviewed.   Treatment options were discussed in the form of continued nighttime bracing, monitoring symptoms as symptoms have improved following the carpal tunnel CSIs. We discussed if he was to undergo surgical intervention it is likely all of his symptoms may not fully resolve as he does have evidence of cervical radiculopathy on EMG. If he was to undergo a cubital tunnel release we discussed the possibility of a ulnar nerve transposition at which time he would be immobilized in a splint for approx. 2 weeks and he would be unable to lift heavy for approx. 6 weeks.   The patient verbalized understanding of exam findings and treatment plan.   The patient was given the opportunity to ask questions.  Questions were answered to the patient's satisfaction.  The patient decided to move forward with continued nighttime bracing and monitoring his symptoms at this time.  I will see him back in the office as needed.                  Follow Up:  PRN       To Do Next Visit:  Re-evaluation of current issue      Discussions:  Carpal Tunnel Syndrome: The anatomy and physiology of carpal tunnel syndrome was discussed with the patient today.  Increase pressure localized under the transverse carpal ligament can cause pain, numbness, tingling, or dysesthesias within the median nerve distribution as well as feelings of fatigue, clumsiness, or awkwardness.  These symptoms typically occur at night and worse in the morning upon waking.  Eventually, untreated carpal tunnel syndrome can result in weakness and permanent loss of muscle within the thenar compartment of the  hand.  Treatment options were discussed with the patient.  Conservative treatment includes nocturnal resting splints to keep the nerve in a neutral position, ergonomic changes within the work or home environment, activity modification, and tendon gliding exercises. Vitamin B6 one tablet daily over the counter may helpful to reduce symptoms.   Steroid injections within the carpal canal can help a majority of patients, however this is often self-limited in a majority of patients.  Surgical intervention to divide the transverse carpal ligament typically results in a long-lasting relief of the patient's complaints, with the recurrence rate of less than 1%.                                                                                                                                                                          and Cubital Tunnel Syndrome: The anatomy and physiology of cubital tunnel syndrome were discussed with the patient today in the office.  Typically, increased elbow flexion activities decrease blood flow within the intraneural spaces, resulting in a feeling of numbness, tingling, weakness, or clumsiness within the hand and fingers.  Occasionally, anatomic structures such as medial elbow osteophytes, the medial head of the triceps, were subluxing ulnar nerve may result in increased pressure or aggravation at the cubital tunnel.  Typical signs and symptoms usually include numbness and tingling within the ring and small finger, weakness with , and weakness with pinch.  Conservative treatment and includes nocturnal bracing to keep the elbow in a semi-extended position, activity modification, therapy, and avoiding excessive elbow flexion activities.  Vitamin B6 one tablet daily over the counter may helpful to reduce symptoms.  A majority of patients typically respond to conservative treatment over a period of approximately 3-6 months.  EMG/NCV testing of the ulnar nerve at the elbow is not as reliable as  carpal tunnel syndrome.  Surgical intervention in the form of in situ release of the ulnar nerve at the elbow or ulnar nerve transposition may be required in up to 20% of patients.       Jared Márquez MD  Attending, Orthopaedic Surgery  Hand, Wrist, and Elbow Surgery  [facility] Orthopaedic [facility]    ______________________________________________________________________________________________    CHIEF COMPLAINT:  Chief Complaint   Patient presents with    Right Wrist - Numbness, Tingling, Follow-up     Left is worse than right.     Left Wrist - Numbness, Tingling, Follow-up     Left is worse than right.        SUBJECTIVE:  Patient is a 70 y.o. RHD male who presents today for a follow up regarding bilateral carpal tunnel syndrome. Symptoms have been present for over a  year. He underwent bilateral carpal tunnel CSI's on 5/16/25. He notes that the CSI's were significantly beneficial for him. He feels numbness and tingling is starting to return, mainly at night. At its worst he notes the whole hand goes numb on the left. He feels symptoms improved 95 percent.      Occupation:       I have personally reviewed all the relevant PMH, PSH, SH, FH, Medications and allergies      PAST MEDICAL HISTORY:  Past Medical History[1]    PAST SURGICAL HISTORY:  Past Surgical History[2]    FAMILY HISTORY:  Family History[3]    SOCIAL HISTORY:  Social History[4]    MEDICATIONS:  Current Medications[5]    ALLERGIES:  Allergies[6]        REVIEW OF SYSTEMS:  Musculoskeletal:        As noted in HPI.   All other systems reviewed and are negative.    VITALS:  There were no vitals filed for this visit.    LABS:  HgA1c:   Lab Results   Component Value Date    HGBA1C 5.6 01/31/2025     BMP:   Lab Results   Component Value Date    CALCIUM 8.8 01/31/2025    K 4.0 01/31/2025    CO2 31 01/31/2025    CL 98 01/31/2025    BUN 24 01/31/2025    CREATININE 1.01 01/31/2025        _____________________________________________________  PHYSICAL EXAMINATION:  General: Well developed and well nourished, alert & oriented x 3, appears comfortable  Psychiatric: Normal  HEENT: Normocephalic, Atraumatic Trachea Midline, No torticollis  Pulmonary: No audible wheezing or respiratory distress   Abdomen/GI: Non tender, non distended   Cardiovascular: No pitting edema, 2+ radial pulse   Skin: No masses, erythema, lacerations, fluctation, ulcerations  Neurovascular: Sensation Intact to the Median, Ulnar, Radial Nerve, Motor Intact to the Median, Ulnar, Radial Nerve, and Pulses Intact  Musculoskeletal: Normal, except as noted in detailed exam and in HPI.      MUSCULOSKELETAL EXAMINATION:    Bilateral upper extremities:    5/5 EPL/FPL and finger abduction   4/5 APB bilaterally  Mild thenar atrophy bilaterally  + tinel's bilateral wrists   - tinel's right elbow  Mildly + tinel's left elbow   No ulnar nerve subluxation bilaterally   - Spurling's bilaterally     ___________________________________________________  STUDIES REVIEWED:  EMG was personally reviewed and independently interpreted by Dr. Márquez and demonstrates mild bilateral carpal tunnel syndrome. Left cubital tunnel syndrome. Bilateral C8 radiculopathy.          PROCEDURES PERFORMED:  Procedures  No Procedures performed today    _____________________________________________________      Scribe Attestation      I,:  Amanda Sood MA am acting as a scribe while in the presence of the attending physician.:       I,:  Jared Márquez MD personally performed the services described in this documentation    as scribed in my presence.:                  [1]   Past Medical History:  Diagnosis Date    Arthritis Years ago    Hypertension     Lateral epicondylitis 01/11/2024    Obesity     Shoulder joint pain 01/11/2024   [2]   Past Surgical History:  Procedure Laterality Date    HERNIA REPAIR  20+ years    VASECTOMY     [3]   Family  History  Problem Relation Name Age of Onset    Diabetes Mother Kamini Dumont     Cancer Mother Kamini Dumont     Glaucoma Mother Kamini Dumont     Hypertension Father Hill     Heart disease Father Hill     Prostate cancer Father Hill     Hearing loss Father Hill     Thyroid disease Sister Jigna    [4]   Social History  Tobacco Use    Smoking status: Never     Passive exposure: Past    Smokeless tobacco: Never   Vaping Use    Vaping status: Never Used   Substance Use Topics    Alcohol use: Yes     Alcohol/week: 10.0 standard drinks of alcohol     Types: 10 Shots of liquor per week    Drug use: Never   [5]   Current Outpatient Medications:     atorvastatin (LIPITOR) 20 mg tablet, Take 1 tablet (20 mg total) by mouth daily, Disp: 90 tablet, Rfl: 1    lisinopril-hydrochlorothiazide (PRINZIDE,ZESTORETIC) 20-25 MG per tablet, Take 1 tablet by mouth 2 (two) times a day, Disp: 60 tablet, Rfl: 2    sildenafil (REVATIO) 20 mg tablet, Take 1 tablet (20 mg total) by mouth if needed (intercourse), Disp: 90 tablet, Rfl: 0  [6] No Known Allergies

## 2025-06-10 NOTE — ASSESSMENT & PLAN NOTE
Anatomy of the condition/s as well as treatment options and expected outcomes were discussed.  Any pertinent imaging or lab results were reviewed with the patient.   EMG was reviewed.   Treatment options were discussed in the form of continued nighttime bracing, monitoring symptoms as symptoms have improved following the carpal tunnel CSIs. We discussed if he was to undergo surgical intervention it is likely all of his symptoms may not fully resolve as he does have evidence of cervical radiculopathy on EMG. If he was to undergo a cubital tunnel release we discussed the possibility of a ulnar nerve transposition at which time he would be immobilized in a splint for approx. 2 weeks and he would be unable to lift heavy for approx. 6 weeks.   The patient verbalized understanding of exam findings and treatment plan.   The patient was given the opportunity to ask questions.  Questions were answered to the patient's satisfaction.  The patient decided to move forward with continued nighttime bracing and monitoring his symptoms at this time.  I will see him back in the office as needed.

## 2025-07-05 DIAGNOSIS — N52.03 COMBINED ARTERIAL INSUFFICIENCY AND CORPORO-VENOUS OCCLUSIVE ERECTILE DYSFUNCTION: ICD-10-CM

## 2025-07-07 ENCOUNTER — TELEPHONE (OUTPATIENT)
Age: 70
End: 2025-07-07

## 2025-07-07 RX ORDER — SILDENAFIL CITRATE 20 MG/1
20 TABLET ORAL AS NEEDED
Qty: 90 TABLET | Refills: 1 | Status: SHIPPED | OUTPATIENT
Start: 2025-07-07

## 2025-07-07 NOTE — TELEPHONE ENCOUNTER
PA for sildenafil (REVATIO) 20 mgSUBMITTED to Medicare     via    [x]CM-KEY: [id number]      [x]PA sent as URGENT    All office notes, labs and other pertaining documents and studies sent. Clinical questions answered. Awaiting determination from insurance company.     Turnaround time for your insurance to make a decision on your Prior Authorization can take 7-21 business days.

## 2025-07-11 NOTE — TELEPHONE ENCOUNTER
PA for sildenafil (REVATIO) 20 mg  DENIED    Reason:(Screenshot if applicable)        Message sent to office clinical pool Yes    Denial letter scanned into Media Yes    We can gladly do an appeal but the process can take about 30-60 days to provide determination. Please have the office staff schedule a Peer to Peer at phone [phone] . If an appeal is truly warranted please have Provider send clinical documentation to the PA department to support the appeal.     **Please follow up with your patient regarding denial and next steps**

## 2025-08-04 DIAGNOSIS — I10 BENIGN ESSENTIAL HYPERTENSION: ICD-10-CM

## 2025-08-06 RX ORDER — LISINOPRIL AND HYDROCHLOROTHIAZIDE 20; 25 MG/1; MG/1
1 TABLET ORAL 2 TIMES DAILY
Qty: 180 TABLET | Refills: 1 | Status: SHIPPED | OUTPATIENT
Start: 2025-08-06